# Patient Record
Sex: MALE | URBAN - METROPOLITAN AREA
[De-identification: names, ages, dates, MRNs, and addresses within clinical notes are randomized per-mention and may not be internally consistent; named-entity substitution may affect disease eponyms.]

---

## 2023-01-06 ENCOUNTER — OFFICE VISIT (OUTPATIENT)
Dept: URBAN - METROPOLITAN AREA CLINIC 88 | Facility: CLINIC | Age: 28
End: 2023-01-06
Payer: COMMERCIAL

## 2023-01-06 DIAGNOSIS — H52.13 MYOPIA, BILATERAL: Primary | ICD-10-CM

## 2023-01-06 PROCEDURE — 92004 COMPRE OPH EXAM NEW PT 1/>: CPT | Performed by: OPTOMETRIST

## 2023-01-06 ASSESSMENT — VISUAL ACUITY
OD: 20/20
OS: 20/20

## 2023-01-06 ASSESSMENT — INTRAOCULAR PRESSURE
OS: 18
OD: 18

## 2023-01-06 NOTE — IMPRESSION/PLAN
Impression: Myopia, bilateral: H52.13. Plan: Reviewed refractive prescription in detail with patient and ways glasses can be used  to improve vision. Release spectacle prescription at this time. CL fit deferred today. Pt interested in refractive procedure. Will refer to Dr. Rene Oneill. All signs/symptoms and risks of retinal detachment and tears were discussed in detail. Patient instructed to call office immediately if any symptoms noted.

## 2023-02-06 ENCOUNTER — REFRACTIVE (OUTPATIENT)
Dept: URBAN - METROPOLITAN AREA CLINIC 4 | Facility: CLINIC | Age: 28
End: 2023-02-06

## 2023-02-06 DIAGNOSIS — H53.8 OTHER VISUAL DISTURBANCES: Primary | ICD-10-CM

## 2023-02-06 ASSESSMENT — INTRAOCULAR PRESSURE
OS: 18
OD: 20

## 2023-02-06 ASSESSMENT — KERATOMETRY
OS: 43.63
OD: 44.00